# Patient Record
(demographics unavailable — no encounter records)

---

## 2025-02-04 NOTE — HISTORY OF PRESENT ILLNESS
[FreeTextEntry1] : Location: left knee Severity: 3/10 Duration: 2 wk Context: playing volleyball, hx of patellar tendinitis.  He landed wrong and felt pain.  Aggravating Factors: jumping Alleviating Factors: rest Associated Symptoms: denies weight loss, fever, chills, change in bowel/bladder habits, weakness, numbness/tingling, Prior Studies:

## 2025-02-04 NOTE — ASSESSMENT
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 oa is asymptomatic  Discussed diagnosis and treatment plan including PT. ice area often needs to stretch hamstrings foam roll itb  f/u 4 wk

## 2025-02-04 NOTE — PHYSICAL EXAM
[FreeTextEntry1] : 35 year old male in NAD and normal body habitus   Gait - normal  left KNEE no swelling, erythema, warmth flexion to 120 deg, ext normal no TTP in patellar and quad tendon, medial/lateral joint, TTP at gerdy's tubercle 5/5 knee ext neg Lachman, Brandt, valgus/varus laxity